# Patient Record
Sex: FEMALE | Race: WHITE | NOT HISPANIC OR LATINO
[De-identification: names, ages, dates, MRNs, and addresses within clinical notes are randomized per-mention and may not be internally consistent; named-entity substitution may affect disease eponyms.]

---

## 2019-09-18 ENCOUNTER — APPOINTMENT (OUTPATIENT)
Dept: ORTHOPEDIC SURGERY | Facility: CLINIC | Age: 64
End: 2019-09-18
Payer: MEDICARE

## 2019-09-18 VITALS
HEART RATE: 60 BPM | BODY MASS INDEX: 33.43 KG/M2 | HEIGHT: 66 IN | WEIGHT: 208 LBS | SYSTOLIC BLOOD PRESSURE: 102 MMHG | DIASTOLIC BLOOD PRESSURE: 70 MMHG

## 2019-09-18 DIAGNOSIS — Z78.9 OTHER SPECIFIED HEALTH STATUS: ICD-10-CM

## 2019-09-18 DIAGNOSIS — F17.200 NICOTINE DEPENDENCE, UNSPECIFIED, UNCOMPLICATED: ICD-10-CM

## 2019-09-18 DIAGNOSIS — Z85.841 PERSONAL HISTORY OF MALIGNANT NEOPLASM OF BRAIN: ICD-10-CM

## 2019-09-18 PROBLEM — Z00.00 ENCOUNTER FOR PREVENTIVE HEALTH EXAMINATION: Status: ACTIVE | Noted: 2019-09-18

## 2019-09-18 PROCEDURE — 99203 OFFICE O/P NEW LOW 30 MIN: CPT

## 2019-09-18 PROCEDURE — 73030 X-RAY EXAM OF SHOULDER: CPT | Mod: RT

## 2019-09-18 RX ORDER — VENLAFAXINE HCL 75 MG
75 TABLET ORAL
Refills: 0 | Status: ACTIVE | COMMUNITY

## 2019-09-18 RX ORDER — ASPIRIN 325 MG/1
TABLET, FILM COATED ORAL
Refills: 0 | Status: ACTIVE | COMMUNITY
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
## 2019-09-18 NOTE — PHYSICAL EXAM
[de-identified] : General Exam\par \par Well developed, well nourished\par No apparent distress\par Oriented to person, place, and time\par Mood: Normal\par Affect: Normal\par Balance and coordination: Normal\par Gait: Normal\par \par Right shoulder exam\par \par Inspection: No swelling, ecchymosis or gross deformity.\par Skin: No masses, No lesions\par Neck: Negative Spurlings, full ROM without pain\par Tenderness: No bicipital tenderness, no tenderness to the greater tuberosity/RTC insertion, no anterior shoulder/lesser tuberosity tenderness. No tenderness SC joint, clavicle, AC joint.\par ROM: 140/50/T12\par Impingement tests: Positive Mancini\par AC Joint: no pain with cross arm testing\par Biceps: Negative speed\par Strength: 5/5 abduction, external rotation, and internal rotation\par Neuro: AIN, PIN, Ulnar nerve motor intact\par Sensation: Intact to light touch in radial, median, ulnar, and axillary nerve distributions\par Vasc: 2+ radial pulse\par  [de-identified] : \par The following radiographs were ordered and read by me during this patients visit. I reviewed each radiograph in detail with the patient and discussed the findings as highlighted below. \par \par 3 views of the right shoulder were obtained today. There is avascular necrosis with collapse of the humeral head. Glenohumeral joint is reduced\par \par

## 2019-09-18 NOTE — DISCUSSION/SUMMARY
[de-identified] : 64-year-old female with right shoulder avascular necrosis with collapse. Likely the result of steroid use in the setting of a prior brain tumor. We discussed treatment options at length both surgical and nonsurgical. Nonsurgical treatment with activity modification physical therapy injections. We discussed surgical treatment right total shoulder arthroplasty. We discussed the surgery postoperative protocol restrictions at length. Risks benefits alternatives of surgery discussed including but not limited to risks such as bleeding infection nerve and vessel injury continued pain stiffness weakness the surgery medical complications such as DVT or pain anesthesia complications. All questions answered. I did discuss anatomic and reverse shoulder arthroplasty I will obtain an MRI prior to surgery to assess her rotator cuff. All questions answered.

## 2019-12-31 ENCOUNTER — OTHER (OUTPATIENT)
Age: 64
End: 2019-12-31

## 2020-01-08 ENCOUNTER — OUTPATIENT (OUTPATIENT)
Dept: OUTPATIENT SERVICES | Facility: HOSPITAL | Age: 65
LOS: 1 days | Discharge: HOME | End: 2020-01-08
Payer: MEDICARE

## 2020-01-08 VITALS
SYSTOLIC BLOOD PRESSURE: 116 MMHG | HEART RATE: 55 BPM | RESPIRATION RATE: 18 BRPM | OXYGEN SATURATION: 98 % | HEIGHT: 66 IN | DIASTOLIC BLOOD PRESSURE: 67 MMHG | WEIGHT: 181.88 LBS | TEMPERATURE: 99 F

## 2020-01-08 DIAGNOSIS — M87.021 IDIOPATHIC ASEPTIC NECROSIS OF RIGHT HUMERUS: ICD-10-CM

## 2020-01-08 DIAGNOSIS — Z01.818 ENCOUNTER FOR OTHER PREPROCEDURAL EXAMINATION: ICD-10-CM

## 2020-01-08 DIAGNOSIS — F41.9 ANXIETY DISORDER, UNSPECIFIED: ICD-10-CM

## 2020-01-08 DIAGNOSIS — Z98.890 OTHER SPECIFIED POSTPROCEDURAL STATES: Chronic | ICD-10-CM

## 2020-01-08 LAB
ANION GAP SERPL CALC-SCNC: 8 MMOL/L — SIGNIFICANT CHANGE UP (ref 5–17)
APTT BLD: 34.8 SEC — SIGNIFICANT CHANGE UP (ref 28.5–37)
BLD GP AB SCN SERPL QL: SIGNIFICANT CHANGE UP
BUN SERPL-MCNC: 9 MG/DL — SIGNIFICANT CHANGE UP (ref 7–23)
CALCIUM SERPL-MCNC: 9.6 MG/DL — SIGNIFICANT CHANGE UP (ref 8.5–10.1)
CHLORIDE SERPL-SCNC: 107 MMOL/L — SIGNIFICANT CHANGE UP (ref 96–108)
CO2 SERPL-SCNC: 26 MMOL/L — SIGNIFICANT CHANGE UP (ref 22–31)
CREAT SERPL-MCNC: 0.98 MG/DL — SIGNIFICANT CHANGE UP (ref 0.5–1.3)
GLUCOSE SERPL-MCNC: 82 MG/DL — SIGNIFICANT CHANGE UP (ref 70–99)
HBA1C BLD-MCNC: 5.4 % — SIGNIFICANT CHANGE UP (ref 4–5.6)
HCT VFR BLD CALC: 46.9 % — HIGH (ref 34.5–45)
HGB BLD-MCNC: 15.7 G/DL — HIGH (ref 11.5–15.5)
INR BLD: 1.09 RATIO — SIGNIFICANT CHANGE UP (ref 0.88–1.16)
MCHC RBC-ENTMCNC: 31.1 PG — SIGNIFICANT CHANGE UP (ref 27–34)
MCHC RBC-ENTMCNC: 33.5 GM/DL — SIGNIFICANT CHANGE UP (ref 32–36)
MCV RBC AUTO: 92.9 FL — SIGNIFICANT CHANGE UP (ref 80–100)
MRSA PCR RESULT.: SIGNIFICANT CHANGE UP
NRBC # BLD: 0 /100 WBCS — SIGNIFICANT CHANGE UP (ref 0–0)
PLATELET # BLD AUTO: 233 K/UL — SIGNIFICANT CHANGE UP (ref 150–400)
POTASSIUM SERPL-MCNC: 3.7 MMOL/L — SIGNIFICANT CHANGE UP (ref 3.5–5.3)
POTASSIUM SERPL-SCNC: 3.7 MMOL/L — SIGNIFICANT CHANGE UP (ref 3.5–5.3)
PROTHROM AB SERPL-ACNC: 12.2 SEC — SIGNIFICANT CHANGE UP (ref 10–12.9)
RBC # BLD: 5.05 M/UL — SIGNIFICANT CHANGE UP (ref 3.8–5.2)
RBC # FLD: 15.1 % — HIGH (ref 10.3–14.5)
S AUREUS DNA NOSE QL NAA+PROBE: SIGNIFICANT CHANGE UP
SODIUM SERPL-SCNC: 141 MMOL/L — SIGNIFICANT CHANGE UP (ref 135–145)
WBC # BLD: 5.29 K/UL — SIGNIFICANT CHANGE UP (ref 3.8–10.5)
WBC # FLD AUTO: 5.29 K/UL — SIGNIFICANT CHANGE UP (ref 3.8–10.5)

## 2020-01-08 PROCEDURE — 93010 ELECTROCARDIOGRAM REPORT: CPT

## 2020-01-08 NOTE — H&P PST ADULT - HISTORY OF PRESENT ILLNESS
64F pmh anxiety, brain cancer (s/p sx/chemo/RT) c/o right shoulder pain found to have idiopathic aseptic necrosis of right humerus here for PST for scheduled Right total shoulder replacement

## 2020-01-08 NOTE — H&P PST ADULT - ASSESSMENT
Airway patent, TM normal bilaterally, normal appearing mouth, throat, neck supple with full range of motion, no cervical adenopathy. + clear rhinorrhea 64F pmh anxiety, brain cancer (s/p sx/chemo/RT) c/o right shoulder pain found to have idiopathic aseptic necrosis of right humerus here for PST for scheduled Right total shoulder replacement  CAPRINI SCORE    AGE RELATED RISK FACTORS                                                       MOBILITY RELATED FACTORS  [ ] Age 41-60 years                                            (1 Point)                  [ ] Bed rest                                                        (1 Point)  [x ] Age: 61-74 years                                           (2 Points)                [ ] Plaster cast                                                   (2 Points)  [ ] Age= 75 years                                              (3 Points)                 [ ] Bed bound for more than 72 hours                   (2 Points)    DISEASE RELATED RISK FACTORS                                               GENDER SPECIFIC FACTORS  [ ] Edema in the lower extremities                       (1 Point)                  [ ] Pregnancy                                                     (1 Point)  [ ] Varicose veins                                               (1 Point)                  [ ] Post-partum < 6 weeks                                   (1 Point)             [ x] BMI > 25 Kg/m2                                            (1 Point)                  [ ] Hormonal therapy  or oral contraception            (1 Point)                 [ ] Sepsis (in the previous month)                        (1 Point)                  [ ] History of pregnancy complications  [ ] Pneumonia or serious lung disease                                               [ ] Unexplained or recurrent                       (1 Point)           (in the previous month)                               (1 Point)  [ ] Abnormal pulmonary function test                     (1 Point)                 SURGERY RELATED RISK FACTORS  [ ] Acute myocardial infarction                              (1 Point)                 [ ]  Section                                            (1 Point)  [ ] Congestive heart failure (in the previous month)  (1 Point)                 [ ] Minor surgery                                                 (1 Point)   [ ] Inflammatory bowel disease                             (1 Point)                 [ ] Arthroscopic surgery                                        (2 Points)  [ ] Central venous access                                    (2 Points)                [ ] General surgery lasting more than 45 minutes   (2 Points)       [ ] Stroke (in the previous month)                          (5 Points)               [x ] Elective arthroplasty                                        (5 Points)                                                                                                                                               HEMATOLOGY RELATED FACTORS                                                 TRAUMA RELATED RISK FACTORS  [ ] Prior episodes of VTE                                     (3 Points)                 [ ] Fracture of the hip, pelvis, or leg                       (5 Points)  [ ] Positive family history for VTE                         (3 Points)                 [ ] Acute spinal cord injury (in the previous month)  (5 Points)  [ ] Prothrombin 67181 A                                      (3 Points)                 [ ] Paralysis  (less than 1 month)                          (5 Points)  [ ] Factor V Leiden                                             (3 Points)                 [ ] Multiple Trauma within 1 month                         (5 Points)  [ ] Lupus anticoagulants                                     (3 Points)                                                           [ ] Anticardiolipin antibodies                                (3 Points)                                                       [ ] High homocysteine in the blood                      (3 Points)                                             [ ] Other congenital or acquired thrombophilia       (3 Points)                                                [ ] Heparin induced thrombocytopenia                  (3 Points)                                          Total Score [    8      ]

## 2020-01-08 NOTE — H&P PST ADULT - NSICDXPROBLEM_GEN_ALL_CORE_FT
PROBLEM DIAGNOSES  Problem: Idiopathic aseptic necrosis of right humerus  Assessment and Plan: Right total shoulder replacement  labs - cbc,pt/ptt,bmp,t&s,nose cx,ekg  M/C required -- ending  preop 3 day hibiclens held 2/2 allergy advised to use antibacterial soap.   instructed on nose cx if positive use mupuricin 5 days and checklist given  take routine meds DOS with sips of water. avoid NSAID and OTC supplements. verbalized understanding  information on proper nutrition , increase protein and better food choices provided in packet     Problem: Anxiety  Assessment and Plan: Continue current regimen and medications.

## 2020-01-08 NOTE — H&P PST ADULT - NSANTHOSAYNRD_GEN_A_CORE
No. NATACHA screening performed.  STOP BANG Legend: 0-2 = LOW Risk; 3-4 = INTERMEDIATE Risk; 5-8 = HIGH Risk

## 2020-01-15 ENCOUNTER — FORM ENCOUNTER (OUTPATIENT)
Age: 65
End: 2020-01-15

## 2020-01-16 ENCOUNTER — RESULT REVIEW (OUTPATIENT)
Age: 65
End: 2020-01-16

## 2020-01-16 ENCOUNTER — TRANSCRIPTION ENCOUNTER (OUTPATIENT)
Age: 65
End: 2020-01-16

## 2020-01-16 ENCOUNTER — APPOINTMENT (OUTPATIENT)
Dept: ORTHOPEDIC SURGERY | Facility: HOSPITAL | Age: 65
End: 2020-01-16

## 2020-01-16 ENCOUNTER — INPATIENT (INPATIENT)
Facility: HOSPITAL | Age: 65
LOS: 0 days | Discharge: HOME | End: 2020-01-17
Attending: ORTHOPAEDIC SURGERY | Admitting: ORTHOPAEDIC SURGERY
Payer: MEDICARE

## 2020-01-16 VITALS — WEIGHT: 179.9 LBS | HEIGHT: 66 IN

## 2020-01-16 DIAGNOSIS — Z98.890 OTHER SPECIFIED POSTPROCEDURAL STATES: Chronic | ICD-10-CM

## 2020-01-16 LAB
ANION GAP SERPL CALC-SCNC: 7 MMOL/L — SIGNIFICANT CHANGE UP (ref 5–17)
BUN SERPL-MCNC: 14 MG/DL — SIGNIFICANT CHANGE UP (ref 7–23)
CALCIUM SERPL-MCNC: 8.6 MG/DL — SIGNIFICANT CHANGE UP (ref 8.5–10.1)
CHLORIDE SERPL-SCNC: 110 MMOL/L — HIGH (ref 96–108)
CO2 SERPL-SCNC: 24 MMOL/L — SIGNIFICANT CHANGE UP (ref 22–31)
CREAT SERPL-MCNC: 0.91 MG/DL — SIGNIFICANT CHANGE UP (ref 0.5–1.3)
GLUCOSE SERPL-MCNC: 103 MG/DL — HIGH (ref 70–99)
HCT VFR BLD CALC: 42 % — SIGNIFICANT CHANGE UP (ref 34.5–45)
HGB BLD-MCNC: 13.6 G/DL — SIGNIFICANT CHANGE UP (ref 11.5–15.5)
MCHC RBC-ENTMCNC: 30.8 PG — SIGNIFICANT CHANGE UP (ref 27–34)
MCHC RBC-ENTMCNC: 32.4 GM/DL — SIGNIFICANT CHANGE UP (ref 32–36)
MCV RBC AUTO: 95 FL — SIGNIFICANT CHANGE UP (ref 80–100)
NRBC # BLD: 0 /100 WBCS — SIGNIFICANT CHANGE UP (ref 0–0)
PLATELET # BLD AUTO: 167 K/UL — SIGNIFICANT CHANGE UP (ref 150–400)
POTASSIUM SERPL-MCNC: 4.2 MMOL/L — SIGNIFICANT CHANGE UP (ref 3.5–5.3)
POTASSIUM SERPL-SCNC: 4.2 MMOL/L — SIGNIFICANT CHANGE UP (ref 3.5–5.3)
RBC # BLD: 4.42 M/UL — SIGNIFICANT CHANGE UP (ref 3.8–5.2)
RBC # FLD: 14.8 % — HIGH (ref 10.3–14.5)
SODIUM SERPL-SCNC: 141 MMOL/L — SIGNIFICANT CHANGE UP (ref 135–145)
WBC # BLD: 8.86 K/UL — SIGNIFICANT CHANGE UP (ref 3.8–10.5)
WBC # FLD AUTO: 8.86 K/UL — SIGNIFICANT CHANGE UP (ref 3.8–10.5)

## 2020-01-16 PROCEDURE — 73030 X-RAY EXAM OF SHOULDER: CPT | Mod: 26,RT

## 2020-01-16 PROCEDURE — 88311 DECALCIFY TISSUE: CPT | Mod: 26

## 2020-01-16 PROCEDURE — 88309 TISSUE EXAM BY PATHOLOGIST: CPT | Mod: 26

## 2020-01-16 PROCEDURE — 23472 RECONSTRUCT SHOULDER JOINT: CPT | Mod: RT

## 2020-01-16 RX ORDER — VENLAFAXINE HCL 75 MG
75 CAPSULE, EXT RELEASE 24 HR ORAL DAILY
Refills: 0 | Status: DISCONTINUED | OUTPATIENT
Start: 2020-01-16 | End: 2020-01-17

## 2020-01-16 RX ORDER — ASPIRIN/CALCIUM CARB/MAGNESIUM 324 MG
325 TABLET ORAL DAILY
Refills: 0 | Status: DISCONTINUED | OUTPATIENT
Start: 2020-01-17 | End: 2020-01-17

## 2020-01-16 RX ORDER — ACETAMINOPHEN 500 MG
650 TABLET ORAL EVERY 6 HOURS
Refills: 0 | Status: DISCONTINUED | OUTPATIENT
Start: 2020-01-16 | End: 2020-01-17

## 2020-01-16 RX ORDER — POLYETHYLENE GLYCOL 3350 17 G/17G
17 POWDER, FOR SOLUTION ORAL DAILY
Refills: 0 | Status: DISCONTINUED | OUTPATIENT
Start: 2020-01-16 | End: 2020-01-17

## 2020-01-16 RX ORDER — CEFAZOLIN SODIUM 1 G
2000 VIAL (EA) INJECTION EVERY 8 HOURS
Refills: 0 | Status: COMPLETED | OUTPATIENT
Start: 2020-01-16 | End: 2020-01-17

## 2020-01-16 RX ORDER — SENNA PLUS 8.6 MG/1
2 TABLET ORAL AT BEDTIME
Refills: 0 | Status: DISCONTINUED | OUTPATIENT
Start: 2020-01-16 | End: 2020-01-17

## 2020-01-16 RX ORDER — PANTOPRAZOLE SODIUM 20 MG/1
40 TABLET, DELAYED RELEASE ORAL
Refills: 0 | Status: DISCONTINUED | OUTPATIENT
Start: 2020-01-16 | End: 2020-01-17

## 2020-01-16 RX ORDER — BENZOCAINE AND MENTHOL 5; 1 G/100ML; G/100ML
1 LIQUID ORAL
Refills: 0 | Status: DISCONTINUED | OUTPATIENT
Start: 2020-01-16 | End: 2020-01-17

## 2020-01-16 RX ORDER — FOLIC ACID 0.8 MG
1 TABLET ORAL DAILY
Refills: 0 | Status: DISCONTINUED | OUTPATIENT
Start: 2020-01-16 | End: 2020-01-17

## 2020-01-16 RX ORDER — OXYCODONE HYDROCHLORIDE 5 MG/1
1 TABLET ORAL
Qty: 20 | Refills: 0
Start: 2020-01-16 | End: 2020-01-22

## 2020-01-16 RX ORDER — DOCUSATE SODIUM 100 MG
1 CAPSULE ORAL
Qty: 60 | Refills: 0
Start: 2020-01-16 | End: 2020-02-14

## 2020-01-16 RX ORDER — HYDROMORPHONE HYDROCHLORIDE 2 MG/ML
0.5 INJECTION INTRAMUSCULAR; INTRAVENOUS; SUBCUTANEOUS EVERY 4 HOURS
Refills: 0 | Status: DISCONTINUED | OUTPATIENT
Start: 2020-01-16 | End: 2020-01-17

## 2020-01-16 RX ORDER — ACETAMINOPHEN 500 MG
1000 TABLET ORAL ONCE
Refills: 0 | Status: COMPLETED | OUTPATIENT
Start: 2020-01-16 | End: 2020-01-16

## 2020-01-16 RX ORDER — SODIUM CHLORIDE 9 MG/ML
1000 INJECTION, SOLUTION INTRAVENOUS
Refills: 0 | Status: DISCONTINUED | OUTPATIENT
Start: 2020-01-16 | End: 2020-01-16

## 2020-01-16 RX ORDER — SODIUM CHLORIDE 9 MG/ML
1000 INJECTION, SOLUTION INTRAVENOUS
Refills: 0 | Status: DISCONTINUED | OUTPATIENT
Start: 2020-01-16 | End: 2020-01-17

## 2020-01-16 RX ORDER — ONDANSETRON 8 MG/1
1 TABLET, FILM COATED ORAL
Qty: 10 | Refills: 0
Start: 2020-01-16 | End: 2020-01-22

## 2020-01-16 RX ORDER — ONDANSETRON 8 MG/1
4 TABLET, FILM COATED ORAL EVERY 6 HOURS
Refills: 0 | Status: DISCONTINUED | OUTPATIENT
Start: 2020-01-16 | End: 2020-01-17

## 2020-01-16 RX ORDER — SODIUM CHLORIDE 9 MG/ML
3 INJECTION INTRAMUSCULAR; INTRAVENOUS; SUBCUTANEOUS EVERY 8 HOURS
Refills: 0 | Status: DISCONTINUED | OUTPATIENT
Start: 2020-01-16 | End: 2020-01-16

## 2020-01-16 RX ORDER — ASPIRIN/CALCIUM CARB/MAGNESIUM 324 MG
1 TABLET ORAL
Qty: 28 | Refills: 0
Start: 2020-01-16 | End: 2020-02-12

## 2020-01-16 RX ORDER — ASCORBIC ACID 60 MG
500 TABLET,CHEWABLE ORAL
Refills: 0 | Status: DISCONTINUED | OUTPATIENT
Start: 2020-01-16 | End: 2020-01-17

## 2020-01-16 RX ORDER — MAGNESIUM HYDROXIDE 400 MG/1
30 TABLET, CHEWABLE ORAL DAILY
Refills: 0 | Status: DISCONTINUED | OUTPATIENT
Start: 2020-01-16 | End: 2020-01-17

## 2020-01-16 RX ORDER — OXYCODONE HYDROCHLORIDE 5 MG/1
5 TABLET ORAL EVERY 4 HOURS
Refills: 0 | Status: DISCONTINUED | OUTPATIENT
Start: 2020-01-16 | End: 2020-01-17

## 2020-01-16 RX ORDER — OXYCODONE HYDROCHLORIDE 5 MG/1
10 TABLET ORAL EVERY 4 HOURS
Refills: 0 | Status: DISCONTINUED | OUTPATIENT
Start: 2020-01-16 | End: 2020-01-17

## 2020-01-16 RX ORDER — LANOLIN ALCOHOL/MO/W.PET/CERES
3 CREAM (GRAM) TOPICAL AT BEDTIME
Refills: 0 | Status: DISCONTINUED | OUTPATIENT
Start: 2020-01-16 | End: 2020-01-17

## 2020-01-16 RX ORDER — HYDROMORPHONE HYDROCHLORIDE 2 MG/ML
0.5 INJECTION INTRAMUSCULAR; INTRAVENOUS; SUBCUTANEOUS
Refills: 0 | Status: DISCONTINUED | OUTPATIENT
Start: 2020-01-16 | End: 2020-01-16

## 2020-01-16 RX ORDER — METOCLOPRAMIDE HCL 10 MG
10 TABLET ORAL ONCE
Refills: 0 | Status: DISCONTINUED | OUTPATIENT
Start: 2020-01-16 | End: 2020-01-16

## 2020-01-16 RX ORDER — VENLAFAXINE HCL 75 MG
1 CAPSULE, EXT RELEASE 24 HR ORAL
Qty: 0 | Refills: 0 | DISCHARGE

## 2020-01-16 RX ADMIN — Medication 75 MILLIGRAM(S): at 23:26

## 2020-01-16 RX ADMIN — Medication 100 MILLIGRAM(S): at 19:34

## 2020-01-16 RX ADMIN — SODIUM CHLORIDE 75 MILLILITER(S): 9 INJECTION, SOLUTION INTRAVENOUS at 15:03

## 2020-01-16 RX ADMIN — Medication 3 MILLIGRAM(S): at 23:30

## 2020-01-16 RX ADMIN — Medication 500 MILLIGRAM(S): at 19:33

## 2020-01-16 NOTE — DISCHARGE NOTE PROVIDER - CARE PROVIDER_API CALL
Daniel Pickett)  Orthopaedic Surgery  [address], [address]  [place] [address]  Phone: [phone]  Fax: [phone]  Follow Up Time:

## 2020-01-16 NOTE — DISCHARGE NOTE PROVIDER - NSDCMRMEDTOKEN_GEN_ALL_CORE_FT
Adult Aspirin 325 mg oral tablet: 1 tab(s) orally once a day   docusate sodium 50 mg oral capsule: 1 cap(s) orally 2 times a day, As Needed   Effexor XR 75 mg oral capsule, extended release: 1 cap(s) orally once a day  oxyCODONE 5 mg oral tablet: 1 tab(s) orally every 4 to 6 hours, As Needed MDD:4   For moderate to severe pain  Zofran ODT 4 mg oral tablet, disintegratin tab(s) orally every 6 hours, As Needed  for nausea

## 2020-01-16 NOTE — DISCHARGE NOTE PROVIDER - NSDCCPCAREPLAN_GEN_ALL_CORE_FT
PRINCIPAL DISCHARGE DIAGNOSIS  Diagnosis: Avascular necrosis of right humeral head  Assessment and Plan of Treatment:

## 2020-01-16 NOTE — PHYSICAL THERAPY INITIAL EVALUATION ADULT - CRITERIA FOR SKILLED THERAPEUTIC INTERVENTIONS
home with outpatient services, NO DME needed/impairments found/therapy frequency/risk reduction/prevention/rehab potential/anticipated discharge recommendation/functional limitations in following categories/predicted duration of therapy intervention/anticipated equipment needs at discharge

## 2020-01-16 NOTE — DISCHARGE NOTE PROVIDER - NSDCFUADDINST_GEN_ALL_CORE_FT
1.	Pain Control  2.	Walking with full weight bearing as tolerated, with assistive devices (walker/Cane as Needed)  3.	DVT Prophylaxis for 28 days with aspirin, take as prescribed  4.	PT as needed  5.	Follow up with Dr. Pickett as outpatient in 10-14 Days after discharge from the hospital or rehab. Call office for appointment.  6.	Remove Staples Post-Op Day 14, and Remove Dressing Post-Op Day 10, with Daily Dressing Changes as Need.  7.	Ice affected area as Needed  8.	Keep Dressing clean and dry.

## 2020-01-16 NOTE — PROGRESS NOTE ADULT - SUBJECTIVE AND OBJECTIVE BOX
Ortho post-op check    Pt seen and examined at bedside, resting comfortably. Tolerated procedure well without complications. Denies numbness/tingling, chest pain, SOB.    T(C): 36.6 (01-16-20 @ 16:40), Max: 36.6 (01-16-20 @ 13:11)  HR: 77 (01-16-20 @ 16:40) (69 - 77)  BP: 140/60 (01-16-20 @ 16:40) (107/50 - 151/76)  RR: 16 (01-16-20 @ 16:40) (15 - 20)  SpO2: 97% (01-16-20 @ 16:40) (96% - 98%)                          13.6   8.86  )-----------( 167      ( 16 Jan 2020 13:54 )             42.0     16 Jan 2020 13:54    141    |  110    |  14     ----------------------------<  103    4.2     |  24     |  0.91     Ca    8.6        16 Jan 2020 13:54        Physical Exam:  Gen: NAD, A&Ox3    RUE:  Dressing CDI in shoulder immobilizer  SILT C5-T1  + Musc/Axillary/Median/Ulnar/Radial/AIN/PIN motor intact  + Radial pulse  Compartments soft, compressible    A/P: 64yFemale s/p R TSA POD0    Tolerated procedure well without complications    - Pain control  - PT/OT  - NWB RUE - FF90, external rotation to neutral, NO ACTIVE INTERNAL ROTATION  - DVT prophylaxis to start tomorrow (ASA)  - Perioperative antibiotics per protocol  - Encourage incentive spirometry, deep breathing exercises  - Will discuss with attending, Dr. Pickett and advise if plan changes    Cornelius Bruce, DO PGY1  Orthopaedic Surgery

## 2020-01-16 NOTE — PATIENT PROFILE ADULT - NSPROMUTANXFEARADDRESSFT_GEN_A_NUR
"Attempted to call report to [facility]. Informed by nurse at [facility] that she is unable to take report until she has discharge summary in hand. Per CM at [facility] D/C summary was faxed 3 hours ago. Offered to refax summary and nurse said she would need to talk to someone there due to not being \"informed\" of patient returning. Notified CM of conversation and notified nurse at [facility] that Ambulance is scheduled for 1600.  "
keep comfortable

## 2020-01-16 NOTE — PHYSICAL THERAPY INITIAL EVALUATION ADULT - BED MOBILITY TRAINING, PT EVAL
Pt will be able to move in & out of bed by self independently, maintaining WB precaution in RUE immobilised in an arm sling in 2 to 3 days

## 2020-01-16 NOTE — PHYSICAL THERAPY INITIAL EVALUATION ADULT - GAIT TRAINING, PT EVAL
Pt will be able to ambulate 350 feet using no AD  maintaining WB precaution in RUE immobilised in an arm sling   independently in 2 weeks

## 2020-01-16 NOTE — PHYSICAL THERAPY INITIAL EVALUATION ADULT - PLANNED THERAPY INTERVENTIONS, PT EVAL
strengthening/transfer training/bed mobility training/Pt will be able to negotiate 6 steps using left rail up, 13 steps using right rail up facing the rail sideways, 7 steps using  right rail down facing the rail sideways, independently in 2 to 3 days/gait training/balance training

## 2020-01-16 NOTE — PHYSICAL THERAPY INITIAL EVALUATION ADULT - BALANCE TRAINING, PT EVAL
Pt will improve static & dynamic standing balance to Good using  no AD  maintaining WB precaution in RUE immobilised in an arm sling   to perform ADL, Gait independently

## 2020-01-16 NOTE — DISCHARGE NOTE PROVIDER - HOSPITAL COURSE
The patient is a 64 year old female status post elective Total shoulder Arthroplasty to the right shoulder after failing outpatient non-operative conservative management.  Patient presented to [facility] after being medically cleared for an elective surgical procedure. The patient was taken to the operating room on date mentioned above. Prophylactic antibiotics were started before the procedure and continued for 24 hours.  There were no complications during the procedure and patient tolerated the procedure well. The patient was transferred to recovery room in stable condition and subsequently to surgical floor. Patient was placed on aspirin for anticoagulation.  All home medications were continued. The patient received physical therapy daily and daily labs were followed. The dressing was kept clean, dry, intact. The rest of the hospital stay was unremarkable.

## 2020-01-16 NOTE — PHYSICAL THERAPY INITIAL EVALUATION ADULT - ASSISTIVE DEVICE FOR STAIR TRANSFER, REHAB EVAL
1st 3 steps using left rail up, left rail down, 2nd rep  facing right rail negotiating sideways, left rail down, 3rd rep 3 steps facing rightrail descending down/left rail up/left rail down

## 2020-01-16 NOTE — PHYSICAL THERAPY INITIAL EVALUATION ADULT - MD ORDER
NWB in RUE immobilised in an arm sling, Forward flexion to 90 degrees, ER to neutral, passive IR to chest wall, no active IR

## 2020-01-16 NOTE — PHYSICAL THERAPY INITIAL EVALUATION ADULT - PRECAUTIONS/LIMITATIONS, REHAB EVAL
NWB in RUE immobilised in an arm sling, Forward flexion to 90 degrees, ER to neutral, passive IR to chest wall, no active IR/fall precautions

## 2020-01-16 NOTE — PHYSICAL THERAPY INITIAL EVALUATION ADULT - TRANSFER TRAINING, PT EVAL
Pt will be able to perform sit to stand, stand pivot transfer using  no AD maintaining WB precautions in RUE immobilised in an arm sling  independently in 2 to 3 days

## 2020-01-16 NOTE — PHYSICAL THERAPY INITIAL EVALUATION ADULT - ADDITIONAL COMMENTS
As per pt, her spouse, she lives with her spouse in a house with 6 steps to enter with bilateral rail wider apart, once inside there are 13 steps with right rail going up to the 2nd floor for her bed room, then again 13 steps to go to basement in which 1st 6 steps to basement has no rails but wall on the side, last 7 steps with right rail going down to the basement to do laundry. As per pt she was independent in all functional mobility without any AD PTA

## 2020-01-17 ENCOUNTER — TRANSCRIPTION ENCOUNTER (OUTPATIENT)
Age: 65
End: 2020-01-17

## 2020-01-17 VITALS — HEART RATE: 50 BPM | DIASTOLIC BLOOD PRESSURE: 67 MMHG | SYSTOLIC BLOOD PRESSURE: 130 MMHG

## 2020-01-17 LAB
ANION GAP SERPL CALC-SCNC: 7 MMOL/L — SIGNIFICANT CHANGE UP (ref 5–17)
BUN SERPL-MCNC: 15 MG/DL — SIGNIFICANT CHANGE UP (ref 7–23)
CALCIUM SERPL-MCNC: 8.9 MG/DL — SIGNIFICANT CHANGE UP (ref 8.5–10.1)
CHLORIDE SERPL-SCNC: 108 MMOL/L — SIGNIFICANT CHANGE UP (ref 96–108)
CO2 SERPL-SCNC: 26 MMOL/L — SIGNIFICANT CHANGE UP (ref 22–31)
CREAT SERPL-MCNC: 1.03 MG/DL — SIGNIFICANT CHANGE UP (ref 0.5–1.3)
GLUCOSE SERPL-MCNC: 108 MG/DL — HIGH (ref 70–99)
HCT VFR BLD CALC: 39.6 % — SIGNIFICANT CHANGE UP (ref 34.5–45)
HGB BLD-MCNC: 13 G/DL — SIGNIFICANT CHANGE UP (ref 11.5–15.5)
MCHC RBC-ENTMCNC: 31 PG — SIGNIFICANT CHANGE UP (ref 27–34)
MCHC RBC-ENTMCNC: 32.8 GM/DL — SIGNIFICANT CHANGE UP (ref 32–36)
MCV RBC AUTO: 94.5 FL — SIGNIFICANT CHANGE UP (ref 80–100)
NRBC # BLD: 0 /100 WBCS — SIGNIFICANT CHANGE UP (ref 0–0)
PLATELET # BLD AUTO: 171 K/UL — SIGNIFICANT CHANGE UP (ref 150–400)
POTASSIUM SERPL-MCNC: 4.2 MMOL/L — SIGNIFICANT CHANGE UP (ref 3.5–5.3)
POTASSIUM SERPL-SCNC: 4.2 MMOL/L — SIGNIFICANT CHANGE UP (ref 3.5–5.3)
RBC # BLD: 4.19 M/UL — SIGNIFICANT CHANGE UP (ref 3.8–5.2)
RBC # FLD: 14.8 % — HIGH (ref 10.3–14.5)
SODIUM SERPL-SCNC: 141 MMOL/L — SIGNIFICANT CHANGE UP (ref 135–145)
WBC # BLD: 10.15 K/UL — SIGNIFICANT CHANGE UP (ref 3.8–10.5)
WBC # FLD AUTO: 10.15 K/UL — SIGNIFICANT CHANGE UP (ref 3.8–10.5)

## 2020-01-17 RX ADMIN — OXYCODONE HYDROCHLORIDE 10 MILLIGRAM(S): 5 TABLET ORAL at 07:27

## 2020-01-17 RX ADMIN — OXYCODONE HYDROCHLORIDE 10 MILLIGRAM(S): 5 TABLET ORAL at 13:48

## 2020-01-17 RX ADMIN — PANTOPRAZOLE SODIUM 40 MILLIGRAM(S): 20 TABLET, DELAYED RELEASE ORAL at 07:43

## 2020-01-17 RX ADMIN — Medication 100 MILLIGRAM(S): at 02:59

## 2020-01-17 RX ADMIN — Medication 1 MILLIGRAM(S): at 11:37

## 2020-01-17 RX ADMIN — Medication 1 TABLET(S): at 11:37

## 2020-01-17 RX ADMIN — Medication 500 MILLIGRAM(S): at 06:07

## 2020-01-17 RX ADMIN — Medication 325 MILLIGRAM(S): at 11:37

## 2020-01-17 RX ADMIN — OXYCODONE HYDROCHLORIDE 5 MILLIGRAM(S): 5 TABLET ORAL at 04:00

## 2020-01-17 RX ADMIN — OXYCODONE HYDROCHLORIDE 5 MILLIGRAM(S): 5 TABLET ORAL at 08:10

## 2020-01-17 RX ADMIN — ONDANSETRON 4 MILLIGRAM(S): 8 TABLET, FILM COATED ORAL at 11:53

## 2020-01-17 RX ADMIN — OXYCODONE HYDROCHLORIDE 5 MILLIGRAM(S): 5 TABLET ORAL at 09:10

## 2020-01-17 RX ADMIN — POLYETHYLENE GLYCOL 3350 17 GRAM(S): 17 POWDER, FOR SOLUTION ORAL at 11:37

## 2020-01-17 RX ADMIN — OXYCODONE HYDROCHLORIDE 10 MILLIGRAM(S): 5 TABLET ORAL at 12:48

## 2020-01-17 RX ADMIN — OXYCODONE HYDROCHLORIDE 5 MILLIGRAM(S): 5 TABLET ORAL at 03:01

## 2020-01-17 RX ADMIN — HYDROMORPHONE HYDROCHLORIDE 0.5 MILLIGRAM(S): 2 INJECTION INTRAMUSCULAR; INTRAVENOUS; SUBCUTANEOUS at 09:33

## 2020-01-17 RX ADMIN — OXYCODONE HYDROCHLORIDE 10 MILLIGRAM(S): 5 TABLET ORAL at 06:27

## 2020-01-17 RX ADMIN — HYDROMORPHONE HYDROCHLORIDE 0.5 MILLIGRAM(S): 2 INJECTION INTRAMUSCULAR; INTRAVENOUS; SUBCUTANEOUS at 09:18

## 2020-01-17 NOTE — OCCUPATIONAL THERAPY INITIAL EVALUATION ADULT - LIVES WITH, PROFILE
spouse/in a private house with 6 steps to enter with wide bilateral rails. Once inside there are 13 steps to the bedroom and bathroom with a right rails. The bathroom has a tub/shower combination with 2 grab bars and standard toilet. spouse/in a private house with 6 steps to enter with wide bilateral rails. Once inside there are 13 steps to the bedroom and bathroom with a right rails. Pt has another 13 steps to get to the laundry room in the basement. The first 6 steps don't have hand rails and the last 7 have a right hand rail. The bathroom has a tub/shower combination with 2 grab bars and standard toilet.

## 2020-01-17 NOTE — OCCUPATIONAL THERAPY INITIAL EVALUATION ADULT - FINE MOTOR COORDINATION TRAINING, OT EVAL
Pt will increase ROM in right shoulder by 10 degrees to assist with dressing upper body within 2 weeks.

## 2020-01-17 NOTE — OCCUPATIONAL THERAPY INITIAL EVALUATION ADULT - REHAB POTENTIAL, OT EVAL
How Severe Is Your Skin Lesion?: mild
Has Your Skin Lesion Been Treated?: not been treated
Is This A New Presentation, Or A Follow-Up?: Skin Lesion
good, to achieve stated therapy goals

## 2020-01-17 NOTE — OCCUPATIONAL THERAPY INITIAL EVALUATION ADULT - SOCIAL CONCERNS
Pt is concerned about how she is going to get to outpatient therapy due to her  working full time and can't drive her. Pt is concerned about how she is going to get to outpatient therapy. Pt emphasized that her  works full time and no one else is available to take her.

## 2020-01-17 NOTE — OCCUPATIONAL THERAPY INITIAL EVALUATION ADULT - PLANNED THERAPY INTERVENTIONS, OT EVAL
parent/caregiver training.../fine motor coordination training/ROM/ADL retraining/IADL retraining/strengthening/stretching

## 2020-01-17 NOTE — OCCUPATIONAL THERAPY INITIAL EVALUATION ADULT - RANGE OF MOTION EXAMINATION, UPPER EXTREMITY
Left UE Active ROM was WNL (within normal limits)/Left UE Passive ROM was WNL (within normal limits)/AAROM of right shoulder 0-70 degrees, external rotation to neutral, internal rotation to chest wall. Elbow, wrist, hand and digits are WFL. Pt makes a full fist.

## 2020-01-17 NOTE — PROGRESS NOTE ADULT - SUBJECTIVE AND OBJECTIVE BOX
Pt seen and examined at bedside. Pain well controlled. No acute events overnight. Denies numbness/tingling, chest pain, SOB.    Vital Signs Last 24 Hrs  T(C): 36.6 (17 Jan 2020 03:25), Max: 36.6 (16 Jan 2020 13:11)  T(F): 97.9 (17 Jan 2020 03:25), Max: 97.9 (16 Jan 2020 16:40)  HR: 88 (17 Jan 2020 03:25) (69 - 93)  BP: 125/78 (17 Jan 2020 03:25) (107/50 - 151/76)  BP(mean): --  RR: 16 (17 Jan 2020 03:25) (15 - 179)  SpO2: 95% (17 Jan 2020 03:25) (94% - 98%)    Physical Exam:  Gen: NAD    RUE:  Dressing CDI in shoulder immobilizer  SILT C5-T1  + AIN/PIN/M/U/R/Ax  2+ RP  Compartments soft, compressible    A/P: 65yo Female s/p R TSA POD1:  - Pain control  - PT/OT  - NWB RUE - FF90, external rotation to neutral, NO ACTIVE INTERNAL ROTATION  - DVT ppx  - IS/OOB  - Home today 1/17

## 2020-01-17 NOTE — DISCHARGE NOTE NURSING/CASE MANAGEMENT/SOCIAL WORK - PATIENT PORTAL LINK FT
You can access the FollowMyHealth Patient Portal offered by [facility] by registering at the following website: [email or web address]. By joining [facility]’s FollowMyHealth portal, you will also be able to view your health information using other applications (apps) compatible with our system.

## 2020-01-17 NOTE — OCCUPATIONAL THERAPY INITIAL EVALUATION ADULT - ADDITIONAL COMMENTS
Prior to admission, Pt was functioning in her roles, driving, self sufficient & ambulating independently without any assistive devices. Presently, pt needs assistance with upper body self care tasks due to decreased ROM, and weakness in RUE. Pt is right hand dominant and wears glasses for reading. Prior to admission, Pt was functioning in her roles, driving, self sufficient & ambulating independently without any assistive devices. Presently, pt needs assistance with upper body self care tasks due to decreased ROM, stiffness, pain and weakness in RUE. Pt is right hand dominant and wears glasses for reading.

## 2020-01-17 NOTE — OCCUPATIONAL THERAPY INITIAL EVALUATION ADULT - GENERAL OBSERVATIONS, REHAB EVAL
Pt was seen for initial OT evaluation, encountered in recliner, with RUE sling on and in NAD. AA&Ox4, cooperative & followed commands. All surgical precautions (FF to 90 degrees, ER to neutral, PROM IR to cheest wall, no AROM of IR and NWB) were reviewed and maintained. Pt c/o of 2/10 pain in right shoulder due to s/p right TSR. This limits, ROM, muscle strength and ADL management. Pt was seen for initial OT evaluation, encountered in recliner, with RUE sling on and in NAD. AA&Ox4, cooperative & followed commands. All surgical precautions (FF to 90 degrees, ER to neutral, PROM IR to chest wall, no AROM of IR and NWB) were reviewed and maintained. Pt c/o of 2/10 pain in right shoulder due to s/p right TSR. This limits, ROM, muscle strength and ADL management.

## 2020-01-21 LAB — SURGICAL PATHOLOGY STUDY: SIGNIFICANT CHANGE UP

## 2020-01-23 DIAGNOSIS — M87.811 OTHER OSTEONECROSIS, RIGHT SHOULDER: ICD-10-CM

## 2020-01-23 DIAGNOSIS — F41.9 ANXIETY DISORDER, UNSPECIFIED: ICD-10-CM

## 2020-01-23 DIAGNOSIS — Z87.891 PERSONAL HISTORY OF NICOTINE DEPENDENCE: ICD-10-CM

## 2020-01-29 ENCOUNTER — APPOINTMENT (OUTPATIENT)
Dept: ORTHOPEDIC SURGERY | Facility: CLINIC | Age: 65
End: 2020-01-29
Payer: MEDICARE

## 2020-01-29 PROBLEM — C71.9 MALIGNANT NEOPLASM OF BRAIN, UNSPECIFIED: Chronic | Status: ACTIVE | Noted: 2020-01-08

## 2020-01-29 PROBLEM — F41.9 ANXIETY DISORDER, UNSPECIFIED: Chronic | Status: ACTIVE | Noted: 2020-01-08

## 2020-01-29 PROCEDURE — 99024 POSTOP FOLLOW-UP VISIT: CPT

## 2020-01-29 NOTE — HISTORY OF PRESENT ILLNESS
[de-identified] : 63 yo F s/p Right total shoulder arthroplasty, 1/16/20.  She is doing well, overall feeling better.  No longer taking pain medication.  Not yet started PT.  Using sling.  Denies numbness/tingling. [de-identified] : R shoulder [de-identified] : We reviewed postoperative protocol and restrictions. Continue sling immobilization encouraged to start physical therapy. Followup one month [de-identified] : right shoulder exam.\par inc healed well. no erythema\par no pain gentle passive rom\par able to flex/ext all fingers\par silt r/u/m/ax\par bcr\par  [de-identified] : 65 yo F s/p Right total shoulder arthroplasty, 1/16/20.

## 2020-02-26 ENCOUNTER — APPOINTMENT (OUTPATIENT)
Dept: ORTHOPEDIC SURGERY | Facility: CLINIC | Age: 65
End: 2020-02-26
Payer: MEDICARE

## 2020-02-26 DIAGNOSIS — M87.021 IDIOPATHIC ASEPTIC NECROSIS OF RIGHT HUMERUS: ICD-10-CM

## 2020-02-26 PROCEDURE — 73030 X-RAY EXAM OF SHOULDER: CPT | Mod: RT

## 2020-02-26 PROCEDURE — 99024 POSTOP FOLLOW-UP VISIT: CPT

## 2020-02-26 NOTE — HISTORY OF PRESENT ILLNESS
[de-identified] : R shoulder [de-identified] : 63 yo F s/p Right total shoulder arthroplasty, 1/16/20.  Doing well.  using sling.  working w PT.  pain controlled [de-identified] : 6 weeks status post total shoulder replacement it well. This continuously continued therapy range of motion followup 6 weeks [de-identified] : \par The following radiographs were ordered and read by me during this patients visit. I reviewed each radiograph in detail with the patient and discussed the findings as highlighted below. \par \par 3 views of the right shoulder obtained in reasonable position shoulder replacement there is no evidence of loosening [de-identified] : 65 yo F s/p Right total shoulder arthroplasty, 1/16/20. [de-identified] : right shoulder exam.\par inc healed well. no erythema\par no pain gentle passive rom\par able to flex/ext all fingers\par silt r/u/m/ax\par bcr\par

## 2020-04-08 ENCOUNTER — APPOINTMENT (OUTPATIENT)
Dept: ORTHOPEDIC SURGERY | Facility: CLINIC | Age: 65
End: 2020-04-08
Payer: MEDICARE

## 2020-04-08 DIAGNOSIS — M87.021 IDIOPATHIC ASEPTIC NECROSIS OF RIGHT HUMERUS: ICD-10-CM

## 2020-04-08 PROCEDURE — 99024 POSTOP FOLLOW-UP VISIT: CPT

## 2020-04-08 NOTE — DISCUSSION/SUMMARY
[de-identified] : We reviewed postoperative protocol restrictions. She is overall quite happy at this point. She will continue her home exercise program which we've discussed with her. She will call with any questions or concerns she should follow up in 3 months for routine checkup or sooner if she experiences pain or any symptoms she expressed agreement with this plan

## 2020-04-08 NOTE — HISTORY OF PRESENT ILLNESS
[Home] : at home, [unfilled] , at the time of the visit. [Medical Office: ([facility])___] : at ~his/her~ medical office located in [place] [Patient] : the patient [de-identified] : 63 yo F s/p Right total shoulder arthroplasty, 1/16/20. Doing well. Overall doing well.  She stopped PT this week because of corona virus.  She reports no pain. denies numb./ting.  overall very happy

## 2020-04-08 NOTE — PHYSICAL EXAM
[de-identified] : right shoulder exam.\par inc healed well. no erythema\par no pain gentle rom\par active \par ER 30\par IR L5 \par able to actively abduct\par able to flex/ext all fingers\par silt r/u/m/ax\par bcr

## 2021-01-15 NOTE — ASU PREOP CHECKLIST - SURGICAL CONSENT
Reading Physician Reading Date Result Priority   Vannessa Rossi DO  [phone] 12/16/2020       Physician Responsible for MQSA Outcome Reason    Vannessa Rossi DO Signed       Narrative & Impression     EXAM: MAMMO SCREENING W BORIS BILATERAL     DATE OF EXAM: 12/16/2020 3:47 PM.     COMPARISON EXAMS: 2017, 2016, 2013.     CLINICAL INDICATION: Screening. Sister and paternal cousin were diagnosed  with breast cancer     TECHNIQUE: Bilateral digital screening mammogram with 2D and tomosynthesis.  Computer-Aided Detection was utilized.     DENSITY: There are scattered areas of fibroglandular density.     FINDINGS: No suspicious asymmetries or groups of microcalcifications.        IMPRESSION: No mammographic evidence for malignancy.      RECOMMENDATION: Routine mammographic screening according to [employer]  [employer] recommendations.     BI-RADS Category 1: Negative.     In compliance with federal regulations, the patient will be sent a lay  summary of the results of this mammogram.             Specimen Collected: 12/16/20 15:50 Last Resulted: 12/16/20 15:53        Above results noted. Repeat mammo in 1 year.   done

## 2024-02-19 NOTE — PATIENT PROFILE ADULT - .
Pediasure 17kcal/oz at 92cc/hr from 6a-2p and 4p-4a + 30cc free water q4  1 pkt Arginaid in 180cc H2O at 8 am 
16-Jan-2020 10:27:01

## 2024-11-30 NOTE — H&P PST ADULT - BLOOD AVOIDANCE/RESTRICTIONS, PROFILE
Pt arrived via EMS for cold exposure. Pts family reports pt has a hx of autism and will go out for walks, but was probably out in the woods since about 8pm yesterday. Pt alert and oriented to baseline, denies any CP, SOB, N/V/D, or any other complaints upon arrival.    none